# Patient Record
Sex: FEMALE | Race: WHITE | NOT HISPANIC OR LATINO | Employment: FULL TIME | URBAN - METROPOLITAN AREA
[De-identification: names, ages, dates, MRNs, and addresses within clinical notes are randomized per-mention and may not be internally consistent; named-entity substitution may affect disease eponyms.]

---

## 2024-06-18 ENCOUNTER — OFFICE VISIT (OUTPATIENT)
Dept: PRIMARY CARE | Facility: CLINIC | Age: 49
End: 2024-06-18
Payer: COMMERCIAL

## 2024-06-18 ENCOUNTER — LAB (OUTPATIENT)
Dept: LAB | Facility: LAB | Age: 49
End: 2024-06-18
Payer: COMMERCIAL

## 2024-06-18 VITALS
WEIGHT: 189 LBS | BODY MASS INDEX: 33.49 KG/M2 | OXYGEN SATURATION: 98 % | TEMPERATURE: 98.6 F | HEIGHT: 63 IN | SYSTOLIC BLOOD PRESSURE: 140 MMHG | DIASTOLIC BLOOD PRESSURE: 90 MMHG | HEART RATE: 108 BPM

## 2024-06-18 DIAGNOSIS — K92.1 MELENA: ICD-10-CM

## 2024-06-18 DIAGNOSIS — R10.30 LOWER ABDOMINAL PAIN: ICD-10-CM

## 2024-06-18 DIAGNOSIS — R82.90 ABNORMAL FINDING ON URINALYSIS: ICD-10-CM

## 2024-06-18 DIAGNOSIS — K21.00 GASTROESOPHAGEAL REFLUX DISEASE WITH ESOPHAGITIS, UNSPECIFIED WHETHER HEMORRHAGE: Primary | ICD-10-CM

## 2024-06-18 DIAGNOSIS — K62.5 BRBPR (BRIGHT RED BLOOD PER RECTUM): ICD-10-CM

## 2024-06-18 PROBLEM — F32.A DEPRESSION: Status: ACTIVE | Noted: 2024-06-18

## 2024-06-18 PROBLEM — E66.9 OBESITY: Status: ACTIVE | Noted: 2024-06-18

## 2024-06-18 PROBLEM — F41.9 ANXIETY: Status: ACTIVE | Noted: 2024-06-18

## 2024-06-18 LAB
ALBUMIN SERPL-MCNC: 4.6 G/DL (ref 3.5–5)
ALP BLD-CCNC: 78 U/L (ref 35–125)
ALT SERPL-CCNC: 13 U/L (ref 5–40)
ANION GAP SERPL CALC-SCNC: 14 MMOL/L
AST SERPL-CCNC: 12 U/L (ref 5–40)
BASOPHILS # BLD AUTO: 0.07 X10*3/UL (ref 0–0.1)
BASOPHILS NFR BLD AUTO: 0.4 %
BILIRUB SERPL-MCNC: 0.3 MG/DL (ref 0.1–1.2)
BUN SERPL-MCNC: 11 MG/DL (ref 8–25)
CALCIUM SERPL-MCNC: 9.9 MG/DL (ref 8.5–10.4)
CHLORIDE SERPL-SCNC: 102 MMOL/L (ref 97–107)
CO2 SERPL-SCNC: 23 MMOL/L (ref 24–31)
CREAT SERPL-MCNC: 0.8 MG/DL (ref 0.4–1.6)
EGFRCR SERPLBLD CKD-EPI 2021: 90 ML/MIN/1.73M*2
EOSINOPHIL # BLD AUTO: 0.02 X10*3/UL (ref 0–0.7)
EOSINOPHIL NFR BLD AUTO: 0.1 %
ERYTHROCYTE [DISTWIDTH] IN BLOOD BY AUTOMATED COUNT: 13.3 % (ref 11.5–14.5)
GLUCOSE SERPL-MCNC: 104 MG/DL (ref 65–99)
HCT VFR BLD AUTO: 40.9 % (ref 36–46)
HGB BLD-MCNC: 12.9 G/DL (ref 12–16)
IMM GRANULOCYTES # BLD AUTO: 0.06 X10*3/UL (ref 0–0.7)
IMM GRANULOCYTES NFR BLD AUTO: 0.4 % (ref 0–0.9)
LYMPHOCYTES # BLD AUTO: 2.32 X10*3/UL (ref 1.2–4.8)
LYMPHOCYTES NFR BLD AUTO: 14.7 %
MCH RBC QN AUTO: 29.4 PG (ref 26–34)
MCHC RBC AUTO-ENTMCNC: 31.5 G/DL (ref 32–36)
MCV RBC AUTO: 93 FL (ref 80–100)
MONOCYTES # BLD AUTO: 0.64 X10*3/UL (ref 0.1–1)
MONOCYTES NFR BLD AUTO: 4.1 %
NEUTROPHILS # BLD AUTO: 12.68 X10*3/UL (ref 1.2–7.7)
NEUTROPHILS NFR BLD AUTO: 80.3 %
NRBC BLD-RTO: 0 /100 WBCS (ref 0–0)
PLATELET # BLD AUTO: 361 X10*3/UL (ref 150–450)
POC APPEARANCE, URINE: CLEAR
POC BILIRUBIN, URINE: NEGATIVE
POC BLOOD, URINE: ABNORMAL
POC COLOR, URINE: YELLOW
POC GLUCOSE, URINE: NEGATIVE MG/DL
POC KETONES, URINE: NEGATIVE MG/DL
POC LEUKOCYTES, URINE: ABNORMAL
POC NITRITE,URINE: NEGATIVE
POC PH, URINE: 7 PH
POC PROTEIN, URINE: NEGATIVE MG/DL
POC SPECIFIC GRAVITY, URINE: 1.01
POC UROBILINOGEN, URINE: 0.2 EU/DL
POTASSIUM SERPL-SCNC: 4.2 MMOL/L (ref 3.4–5.1)
PROT SERPL-MCNC: 7.7 G/DL (ref 5.9–7.9)
RBC # BLD AUTO: 4.39 X10*6/UL (ref 4–5.2)
SODIUM SERPL-SCNC: 139 MMOL/L (ref 133–145)
WBC # BLD AUTO: 15.8 X10*3/UL (ref 4.4–11.3)

## 2024-06-18 PROCEDURE — 87086 URINE CULTURE/COLONY COUNT: CPT

## 2024-06-18 PROCEDURE — 36415 COLL VENOUS BLD VENIPUNCTURE: CPT

## 2024-06-18 PROCEDURE — 80053 COMPREHEN METABOLIC PANEL: CPT

## 2024-06-18 PROCEDURE — 85025 COMPLETE CBC W/AUTO DIFF WBC: CPT

## 2024-06-18 PROCEDURE — 99213 OFFICE O/P EST LOW 20 MIN: CPT

## 2024-06-18 PROCEDURE — 81002 URINALYSIS NONAUTO W/O SCOPE: CPT

## 2024-06-18 RX ORDER — OMEPRAZOLE 40 MG/1
40 CAPSULE, DELAYED RELEASE ORAL
Qty: 30 CAPSULE | Refills: 0 | Status: SHIPPED | OUTPATIENT
Start: 2024-06-18 | End: 2024-07-18

## 2024-06-18 RX ORDER — OMEPRAZOLE 20 MG/1
20 TABLET, DELAYED RELEASE ORAL
COMMUNITY

## 2024-06-18 ASSESSMENT — PATIENT HEALTH QUESTIONNAIRE - PHQ9
SUM OF ALL RESPONSES TO PHQ9 QUESTIONS 1 AND 2: 0
2. FEELING DOWN, DEPRESSED OR HOPELESS: NOT AT ALL
1. LITTLE INTEREST OR PLEASURE IN DOING THINGS: NOT AT ALL

## 2024-06-18 ASSESSMENT — PAIN SCALES - GENERAL: PAINLEVEL: 0-NO PAIN

## 2024-06-18 NOTE — PROGRESS NOTES
"Subjective   Patient ID: Stephanie Archer is a 48 y.o. female who presents for Rectal Bleeding and Abdominal Pain (Lower /X this morning ).    HPI   Stephanie is seen for c/o lower abdominal pain and BRBPR since this morning. Also reports suprapubic tenderness. BM regular, however does report having to strain Sunday. Hx GERD, takes Omeprazole 20mg daily for about a year, doesn't seem to be helping much. Also reports dark stools. Has been taking large amounts of Ibuprofen, almost daily. Very little alcohol use. Denies chest pain, SOB, nausea, vomiting, fever, chills, dizziness, lightheadedness, dysuria, frequency, urgency.     Review of Systems  All other systems have been reviewed and are negative except as noted in the HPI.     Objective   /90 (BP Location: Left arm, Patient Position: Sitting)   Pulse 108   Temp 37 °C (98.6 °F) (Temporal)   Ht 1.6 m (5' 3\")   Wt 85.7 kg (189 lb)   LMP 06/05/2024 (Exact Date)   SpO2 98%   BMI 33.48 kg/m²     Physical Exam  Vitals and nursing note reviewed.   Constitutional:       General: She is not in acute distress.  Eyes:      Extraocular Movements: Extraocular movements intact.      Conjunctiva/sclera: Conjunctivae normal.   Cardiovascular:      Rate and Rhythm: Regular rhythm. Tachycardia present.   Pulmonary:      Effort: Pulmonary effort is normal.      Breath sounds: Normal breath sounds.   Abdominal:      General: Abdomen is flat. Bowel sounds are normal. There is no distension.      Tenderness: There is abdominal tenderness in the suprapubic area. There is no right CVA tenderness or left CVA tenderness.   Musculoskeletal:      Cervical back: Neck supple.   Neurological:      General: No focal deficit present.      Mental Status: She is alert.   Psychiatric:         Mood and Affect: Mood normal.         Assessment/Plan   Problem List Items Addressed This Visit             ICD-10-CM    GERD with esophagitis - Primary K21.00     Needs better control. Increase to " Prilosec 40mg daily. Risks and benefits of medication discussed and prescribed.   Avoid food triggers and remain upright for at least 30 minutes after eating. Avoid NSAIDs, alcohol.   Referral to GI placed.        Relevant Medications    omeprazole (PriLOSEC) 40 mg DR capsule    Other Relevant Orders    Referral to Gastroenterology     Other Visit Diagnoses         Codes    Lower abdominal pain   Acute.   UA +blood, leuks. Will send urine culture to r/o UTI.   Increase fiber and fluids. May take OTC Miralax as directed for constipation.   Labs ordered, will follow up with results.   Follow up with PCP if symptoms persist or worsen.  R10.30    Relevant Orders    POCT UA (nonautomated) manually resulted (Completed)    CBC and Auto Differential (Completed)    Comprehensive metabolic panel (Completed)    Abnormal finding on urinalysis     R82.90    Relevant Orders    Urine Culture (Completed)    BRBPR (bright red blood per rectum)     K62.5    Relevant Orders    Referral to Gastroenterology    Melena     K92.1    Relevant Orders    Referral to Gastroenterology

## 2024-06-18 NOTE — PATIENT INSTRUCTIONS
-Start Omeprazole 40mg daily for 4 weeks. Avoid NSAIDs, alcohol, acidic/spicy foods, caffeine.   -Complete blood work today.   -Will call you with results of urine culture.   -Increase fluid and fiber intake. Miralax for constipation.   -Schedule appointment with GI.   Follow up with PCP in 2 weeks.

## 2024-06-19 LAB — BACTERIA UR CULT: NORMAL

## 2024-06-27 NOTE — ASSESSMENT & PLAN NOTE
Needs better control. Increase to Prilosec 40mg daily. Risks and benefits of medication discussed and prescribed.   Avoid food triggers and remain upright for at least 30 minutes after eating. Avoid NSAIDs, alcohol.   Referral to GI placed.

## 2024-06-28 ENCOUNTER — APPOINTMENT (OUTPATIENT)
Dept: OBSTETRICS AND GYNECOLOGY | Facility: CLINIC | Age: 49
End: 2024-06-28
Payer: COMMERCIAL

## 2024-06-28 VITALS
HEIGHT: 63 IN | DIASTOLIC BLOOD PRESSURE: 88 MMHG | BODY MASS INDEX: 33.49 KG/M2 | SYSTOLIC BLOOD PRESSURE: 162 MMHG | WEIGHT: 189 LBS

## 2024-06-28 DIAGNOSIS — Z12.31 SCREENING MAMMOGRAM FOR BREAST CANCER: ICD-10-CM

## 2024-06-28 DIAGNOSIS — Z30.430 ENCOUNTER FOR IUD INSERTION: ICD-10-CM

## 2024-06-28 DIAGNOSIS — Z01.419 WELL WOMAN EXAM: Primary | ICD-10-CM

## 2024-06-28 LAB
POC BLOOD, URINE: NEGATIVE
POC GLUCOSE, URINE: NEGATIVE MG/DL
POC LEUKOCYTES, URINE: NEGATIVE
POC NITRITE,URINE: NEGATIVE
POC PROTEIN, URINE: NEGATIVE MG/DL
PREGNANCY TEST URINE, POC: NEGATIVE

## 2024-06-28 PROCEDURE — 99396 PREV VISIT EST AGE 40-64: CPT | Performed by: OBSTETRICS & GYNECOLOGY

## 2024-06-28 PROCEDURE — 81002 URINALYSIS NONAUTO W/O SCOPE: CPT | Performed by: OBSTETRICS & GYNECOLOGY

## 2024-06-28 PROCEDURE — 81025 URINE PREGNANCY TEST: CPT | Performed by: OBSTETRICS & GYNECOLOGY

## 2024-06-28 RX ORDER — LORATADINE 10 MG/1
10 TABLET ORAL DAILY
COMMUNITY

## 2024-06-28 NOTE — PROGRESS NOTES
Subjective   Patient ID: Stephanie Archer is a 48 y.o. female who presents for Well woman visit.  Established patient annual exam.  48 years old.  Mirena since 2017.  Discussed it is now approved for 8 years.  We can remove it next year and then see if she has resumption of menses.    Meds include Prilosec and loratadine.  Non-smoker.    He is having some digestive issues.  Has a consult with gastroenterology next week    Pap 2022 was normal.  Next Pap 2027    Exam breast abdominal pelvic exams normal.  Order for mammogram.  Well woman exam.  Follow-up 1 year.  Remove Mirena 2025        Review of Systems   All other systems reviewed and are negative.      Objective   Physical Exam  Constitutional:       Appearance: Normal appearance.   Chest:   Breasts:     Right: Normal.      Left: Normal.   Genitourinary:     General: Normal vulva.      Vagina: Normal.      Cervix: Normal.      Uterus: Normal.       Adnexa: Right adnexa normal and left adnexa normal.   Neurological:      Mental Status: She is alert.         Assessment/Plan   Well woman exam.  Mammogram.2027.  IUD removal in 2025 .  After removing Mirena next year we can check FSH and LH levels to see the need for another IUD         Mainor Espinal MD 06/28/24 8:45 AM

## 2024-07-08 ENCOUNTER — APPOINTMENT (OUTPATIENT)
Dept: PRIMARY CARE | Facility: CLINIC | Age: 49
End: 2024-07-08
Payer: COMMERCIAL

## 2024-07-08 VITALS
BODY MASS INDEX: 33.3 KG/M2 | HEART RATE: 80 BPM | SYSTOLIC BLOOD PRESSURE: 122 MMHG | DIASTOLIC BLOOD PRESSURE: 76 MMHG | OXYGEN SATURATION: 99 % | WEIGHT: 188 LBS

## 2024-07-08 DIAGNOSIS — D72.829 LEUKOCYTOSIS, UNSPECIFIED TYPE: ICD-10-CM

## 2024-07-08 DIAGNOSIS — Z13.220 SCREENING CHOLESTEROL LEVEL: ICD-10-CM

## 2024-07-08 DIAGNOSIS — K21.00 GASTROESOPHAGEAL REFLUX DISEASE WITH ESOPHAGITIS WITHOUT HEMORRHAGE: Primary | ICD-10-CM

## 2024-07-08 PROBLEM — F32.A DEPRESSION: Status: RESOLVED | Noted: 2024-06-18 | Resolved: 2024-07-08

## 2024-07-08 PROBLEM — F41.9 ANXIETY: Status: RESOLVED | Noted: 2024-06-18 | Resolved: 2024-07-08

## 2024-07-08 PROCEDURE — 90715 TDAP VACCINE 7 YRS/> IM: CPT | Performed by: FAMILY MEDICINE

## 2024-07-08 PROCEDURE — 99213 OFFICE O/P EST LOW 20 MIN: CPT | Performed by: FAMILY MEDICINE

## 2024-07-08 PROCEDURE — 90471 IMMUNIZATION ADMIN: CPT | Performed by: FAMILY MEDICINE

## 2024-07-08 PROCEDURE — 1036F TOBACCO NON-USER: CPT | Performed by: FAMILY MEDICINE

## 2024-07-08 ASSESSMENT — PAIN SCALES - GENERAL: PAINLEVEL: 0-NO PAIN

## 2024-07-08 NOTE — PROGRESS NOTES
Subjective   Patient ID: Stephanie Archer is a 48 y.o. female who presents for Follow-up (GERD. Feeling better.).    HPI   Stephanie presents for follow up on her GERD.  She was initially seen 3 weeks ago secondary to lower abdominal discomfort and bright red blood with bowel movement.  She had a history of GERD for which she was taking omeprazole 20 mg daily but did not feel it was helping.  She had noted some dark stools in the past month.  She admittedly was taking large amounts of ibuprofen secondary to joint aches and pains.  She was referred to GI and placed on omeprazole 40 mg daily.  Reflux precautions were given. Will be having EGD and colonoscopy.     Review of Systems  All other systems have been reviewed and are negative except as noted in the HPI.       Objective   /76   Pulse 80   Wt 85.3 kg (188 lb)   LMP 06/05/2024 (Exact Date) Comment: Mirena IUD  SpO2 99%   BMI 33.30 kg/m²     Physical Exam  Alert. NAD.     Assessment/Plan   Problem List Items Addressed This Visit             ICD-10-CM    GERD with esophagitis - Primary K21.00     Continue with omeprazole 40 mg daily.  Ibuprofen only when needed for migraines.  Will use Tylenol for other aches and pains.  Await EGD and colonoscopy by GI.          Other Visit Diagnoses         Codes    Leukocytosis, unspecified type      Labs reviewed with patient.  Noted to have increased WBC.  Will have rechecked in 3 to 4 weeks. D72.829    Relevant Orders    CBC and Auto Differential    Screening cholesterol level     Z13.220    Relevant Orders    Lipid Panel

## 2024-07-08 NOTE — ASSESSMENT & PLAN NOTE
Continue with omeprazole 40 mg daily.  Ibuprofen only when needed for migraines.  Will use Tylenol for other aches and pains.  Await EGD and colonoscopy by GI.

## 2024-07-18 ENCOUNTER — LAB (OUTPATIENT)
Dept: LAB | Facility: LAB | Age: 49
End: 2024-07-18
Payer: COMMERCIAL

## 2024-07-18 DIAGNOSIS — D72.829 LEUKOCYTOSIS, UNSPECIFIED TYPE: ICD-10-CM

## 2024-07-18 DIAGNOSIS — Z13.220 SCREENING CHOLESTEROL LEVEL: ICD-10-CM

## 2024-07-18 LAB
BASOPHILS # BLD AUTO: 0.04 X10*3/UL (ref 0–0.1)
BASOPHILS NFR BLD AUTO: 0.5 %
CHOLEST SERPL-MCNC: 196 MG/DL (ref 133–200)
CHOLEST/HDLC SERPL: 5.2 {RATIO}
EOSINOPHIL # BLD AUTO: 0.03 X10*3/UL (ref 0–0.7)
EOSINOPHIL NFR BLD AUTO: 0.4 %
ERYTHROCYTE [DISTWIDTH] IN BLOOD BY AUTOMATED COUNT: 13.1 % (ref 11.5–14.5)
HCT VFR BLD AUTO: 39.2 % (ref 36–46)
HDLC SERPL-MCNC: 38 MG/DL
HGB BLD-MCNC: 12.5 G/DL (ref 12–16)
IMM GRANULOCYTES # BLD AUTO: 0.02 X10*3/UL (ref 0–0.7)
IMM GRANULOCYTES NFR BLD AUTO: 0.3 % (ref 0–0.9)
LDLC SERPL CALC-MCNC: 129 MG/DL (ref 65–130)
LYMPHOCYTES # BLD AUTO: 2.54 X10*3/UL (ref 1.2–4.8)
LYMPHOCYTES NFR BLD AUTO: 33 %
MCH RBC QN AUTO: 29.2 PG (ref 26–34)
MCHC RBC AUTO-ENTMCNC: 31.9 G/DL (ref 32–36)
MCV RBC AUTO: 92 FL (ref 80–100)
MONOCYTES # BLD AUTO: 0.39 X10*3/UL (ref 0.1–1)
MONOCYTES NFR BLD AUTO: 5.1 %
NEUTROPHILS # BLD AUTO: 4.68 X10*3/UL (ref 1.2–7.7)
NEUTROPHILS NFR BLD AUTO: 60.7 %
NRBC BLD-RTO: 0 /100 WBCS (ref 0–0)
PLATELET # BLD AUTO: 279 X10*3/UL (ref 150–450)
RBC # BLD AUTO: 4.28 X10*6/UL (ref 4–5.2)
TRIGL SERPL-MCNC: 146 MG/DL (ref 40–150)
WBC # BLD AUTO: 7.7 X10*3/UL (ref 4.4–11.3)

## 2024-07-18 PROCEDURE — 85025 COMPLETE CBC W/AUTO DIFF WBC: CPT

## 2024-07-18 PROCEDURE — 36415 COLL VENOUS BLD VENIPUNCTURE: CPT

## 2024-07-18 PROCEDURE — 80061 LIPID PANEL: CPT

## 2024-08-09 ENCOUNTER — APPOINTMENT (OUTPATIENT)
Dept: PRIMARY CARE | Facility: CLINIC | Age: 49
End: 2024-08-09
Payer: COMMERCIAL

## 2024-08-09 VITALS
SYSTOLIC BLOOD PRESSURE: 126 MMHG | BODY MASS INDEX: 33.13 KG/M2 | HEART RATE: 84 BPM | DIASTOLIC BLOOD PRESSURE: 78 MMHG | OXYGEN SATURATION: 98 % | WEIGHT: 187 LBS

## 2024-08-09 DIAGNOSIS — K21.00 GASTROESOPHAGEAL REFLUX DISEASE WITH ESOPHAGITIS WITHOUT HEMORRHAGE: Primary | ICD-10-CM

## 2024-08-09 PROCEDURE — 1036F TOBACCO NON-USER: CPT | Performed by: FAMILY MEDICINE

## 2024-08-09 PROCEDURE — 99213 OFFICE O/P EST LOW 20 MIN: CPT | Performed by: FAMILY MEDICINE

## 2024-08-09 RX ORDER — OMEPRAZOLE 20 MG/1
20 TABLET, DELAYED RELEASE ORAL
COMMUNITY

## 2024-08-09 ASSESSMENT — PAIN SCALES - GENERAL: PAINLEVEL: 0-NO PAIN

## 2024-08-09 NOTE — PROGRESS NOTES
Subjective   Patient ID: Stephanie Archer is a 48 y.o. female who presents for Follow-up (GERD, seen Dr. Elizondo on 7/10. Feeling well.).    HPI  Stephanie presents for follow up on her labs.  She had a colonoscopy and EGD done without any significant abnormalities per patient.  No identifiable cause of her dark stool.  She is back down to 20 mg of Prilosec daily and does not have any symptoms.    Review of Systems  All other systems have been reviewed and are negative except as noted in the HPI.       Objective   Vital Signs:  /78   Pulse 84   Wt 84.8 kg (187 lb)   SpO2 98%   BMI 33.13 kg/m²   Lab Results   Component Value Date    WBC 7.7 07/18/2024     Lab Results   Component Value Date    CHOL 196 07/18/2024     Lab Results   Component Value Date    HDL 38.0 (L) 07/18/2024     Lab Results   Component Value Date    LDLCALC 129 07/18/2024     Lab Results   Component Value Date    TRIG 146 07/18/2024       Physical Exam    Assessment/Plan   Assessment & Plan  Gastroesophageal reflux disease with esophagitis without hemorrhage  Continue with omeprazole 20 mg daily.  May increase to 40 mg if needed based on her symptoms.  Avoid known triggers.           Follow up 6 MONTHS, sooner with any problems or concerns.

## 2024-08-09 NOTE — ASSESSMENT & PLAN NOTE
Continue with omeprazole 20 mg daily.  May increase to 40 mg if needed based on her symptoms.  Avoid known triggers.        Topical Ketoconazole Counseling: Patient counseled that this medication may cause skin irritation or allergic reactions.  In the event of skin irritation, the patient was advised to reduce the amount of the drug applied or use it less frequently.   The patient verbalized understanding of the proper use and possible adverse effects of ketoconazole.  All of the patient's questions and concerns were addressed.

## 2024-10-31 ENCOUNTER — OFFICE VISIT (OUTPATIENT)
Dept: PRIMARY CARE | Facility: CLINIC | Age: 49
End: 2024-10-31
Payer: COMMERCIAL

## 2024-10-31 VITALS
OXYGEN SATURATION: 97 % | RESPIRATION RATE: 18 BRPM | DIASTOLIC BLOOD PRESSURE: 82 MMHG | BODY MASS INDEX: 32.24 KG/M2 | HEART RATE: 101 BPM | TEMPERATURE: 97.8 F | WEIGHT: 182 LBS | SYSTOLIC BLOOD PRESSURE: 130 MMHG

## 2024-10-31 DIAGNOSIS — M77.8 TENDINITIS OF FLEXOR TENDON OF RIGHT HAND: Primary | ICD-10-CM

## 2024-10-31 PROCEDURE — 99213 OFFICE O/P EST LOW 20 MIN: CPT

## 2024-10-31 RX ORDER — PREDNISONE 20 MG/1
40 TABLET ORAL DAILY
Qty: 10 TABLET | Refills: 0 | Status: SHIPPED | OUTPATIENT
Start: 2024-10-31 | End: 2024-11-05

## 2024-10-31 ASSESSMENT — PATIENT HEALTH QUESTIONNAIRE - PHQ9
1. LITTLE INTEREST OR PLEASURE IN DOING THINGS: NOT AT ALL
2. FEELING DOWN, DEPRESSED OR HOPELESS: NOT AT ALL
SUM OF ALL RESPONSES TO PHQ9 QUESTIONS 1 AND 2: 0

## 2024-10-31 ASSESSMENT — PAIN SCALES - GENERAL: PAINLEVEL_OUTOF10: 0-NO PAIN

## 2024-10-31 NOTE — PROGRESS NOTES
Subjective   Patient ID: Stephanie Archer is a 48 y.o. female who presents for Hand Pain (Patient is here for right hand pain x 1 day).    HPI   Stepahnie is seen for right hand pain x1 day. Worse with gripping and bending fingers. No known injury. 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 Has taken Tylenol with mild relief. Right handed. Denies new numbness, tingling, weakness of RUE.     Review of Systems  All other systems have been reviewed and are negative except as noted in the HPI.     Objective   /82 (BP Location: Left arm, Patient Position: Sitting, BP Cuff Size: Large adult)   Pulse 101   Temp 36.6 °C (97.8 °F) (Temporal)   Resp 18   Wt 82.6 kg (182 lb)   SpO2 97%   BMI 32.24 kg/m²     Physical Exam  Vitals and nursing note reviewed.   Constitutional:       General: She is not in acute distress.  Eyes:      Extraocular Movements: Extraocular movements intact.      Conjunctiva/sclera: Conjunctivae normal.   Pulmonary:      Effort: Pulmonary effort is normal.   Musculoskeletal:      Right wrist: Normal.      Right hand: Tenderness (to palpation of palmar surface and with active flexion) present. No swelling or bony tenderness. Normal range of motion. Normal sensation. Normal capillary refill.      Cervical back: Neck supple.   Skin:     Capillary Refill: Capillary refill takes less than 2 seconds.   Neurological:      General: No focal deficit present.      Mental Status: She is alert.   Psychiatric:         Mood and Affect: Mood normal.         Assessment/Plan   Assessment & Plan  Tendinitis of flexor tendon of right hand  Acute, suspect overuse injury.   Prednisone as directed due to sensitivity to NSAIDs. Risks and benefits of medication discussed and prescribed.   OTC Tylenol as directed for pain. Rest, ice. We discussed nighttime bracing with wrist brace that covers palmar surface.   Follow up if symptoms do not improve within 1-2 weeks, or sooner for worsening.     Orders:    predniSONE  (Deltasone) 20 mg tablet; Take 2 tablets (40 mg) by mouth once daily for 5 days.